# Patient Record
Sex: FEMALE | URBAN - METROPOLITAN AREA
[De-identification: names, ages, dates, MRNs, and addresses within clinical notes are randomized per-mention and may not be internally consistent; named-entity substitution may affect disease eponyms.]

---

## 2023-07-25 ENCOUNTER — HOSPITAL ENCOUNTER (EMERGENCY)
Age: 23
Discharge: LEFT BEFORE DISCHARGE | End: 2023-07-25

## 2024-04-12 ENCOUNTER — APPOINTMENT (OUTPATIENT)
Dept: PRIMARY CARE | Facility: CLINIC | Age: 24
End: 2024-04-12
Payer: COMMERCIAL

## 2024-07-10 ENCOUNTER — APPOINTMENT (OUTPATIENT)
Dept: ORTHOPEDIC SURGERY | Facility: CLINIC | Age: 24
End: 2024-07-10
Payer: COMMERCIAL

## 2024-07-31 ENCOUNTER — OFFICE VISIT (OUTPATIENT)
Dept: ORTHOPEDIC SURGERY | Facility: CLINIC | Age: 24
End: 2024-07-31
Payer: COMMERCIAL

## 2024-07-31 VITALS — WEIGHT: 254 LBS | BODY MASS INDEX: 39.87 KG/M2 | HEIGHT: 67 IN

## 2024-07-31 DIAGNOSIS — M54.2 NECK PAIN: Primary | ICD-10-CM

## 2024-07-31 PROCEDURE — 3008F BODY MASS INDEX DOCD: CPT | Performed by: PHYSICIAN ASSISTANT

## 2024-07-31 PROCEDURE — 99213 OFFICE O/P EST LOW 20 MIN: CPT | Performed by: PHYSICIAN ASSISTANT

## 2024-07-31 PROCEDURE — 99203 OFFICE O/P NEW LOW 30 MIN: CPT | Performed by: PHYSICIAN ASSISTANT

## 2024-07-31 RX ORDER — MELOXICAM 15 MG/1
15 TABLET ORAL DAILY
Qty: 30 TABLET | Refills: 3 | Status: SHIPPED | OUTPATIENT
Start: 2024-07-31

## 2024-07-31 ASSESSMENT — PAIN SCALES - GENERAL: PAINLEVEL_OUTOF10: 2

## 2024-07-31 ASSESSMENT — PAIN - FUNCTIONAL ASSESSMENT: PAIN_FUNCTIONAL_ASSESSMENT: 0-10

## 2024-07-31 ASSESSMENT — PAIN DESCRIPTION - DESCRIPTORS: DESCRIPTORS: ACHING;SORE

## 2024-07-31 NOTE — PROGRESS NOTES
Jose Guadalupe is a 23-year-old female reporting clinic today for evaluation of her neck pain.    Her symptoms started approximately 3 weeks ago, she denies injury or trauma.  She has posterior neck pain that radiates into her upper shoulders and triceps bilaterally.  She also feels this pain is causing tension headaches.  She has numbness and cramping in her hands bilaterally, this has been going on for over a year.  She has noticed she is stumbling more often, no recent falls.  No recent change in her fine motor skills or handwriting, although she admits to dropping things more frequently.  She has full control of her bowel and bladder.    Treatment thus far has consisted of Tylenol and muscle relaxers.    She vapes nicotine products daily.  Family, social, and medical histories are obtained and reviewed.    ROS: All other systems have been reviewed and are negative except as previously noted in history of present illness.    Physical Exam:  Const: Well-appearing, well-nourished female in no distress.  Eyes: Normal appearing sclera and conjunctiva, no jaundice, pupils normal in appearance.  Neck: No masses or lymphadenopathy appreciated.  Resp: breathing comfortably, normal respiratory rate rate.  CV: No upper or lower extremity edema.  Musculoskeletal: Normal gait.  Able to heel/toe walk without difficulty.  Cervical ROM is supple.  Strength exam of the upper extremities reveals 5/5 strength in all major muscle groups.  No intrinsic wasting.  Neuro: Sensation is intact and equal bilaterally. Deep tendon reflexes are normal and symmetric.  No clonus, negative Guadalupe sign, negative Lhermitte's sign  Skin: Intact without any lesions, normal turgor.  Psych: Alert and oriented x3, normal mood and affect.    The plan is to start conservative treatment for her acute neck pain with intermittent radicular symptoms.  A referral for physical therapy was provided today.  A prescription for meloxicam 15 mg was sent to her pharmacy  for inflammation.  If she does not have improvement after 6 weeks physical therapy she should follow-up with me at which time consider an MRI of the cervical spine.    **This note was dictated using speech recognition software and was not corrected for spelling or grammatical errors**

## 2024-11-11 PROBLEM — G43.909 MIGRAINES: Status: ACTIVE | Noted: 2022-12-20

## 2024-11-11 NOTE — PROGRESS NOTES
Subjective   Patient ID:   Jose Guadalupe Prince is a 23 y.o. female who presents for Establish Care and Pressure Behind the Eyes.  HPI  New patient here today to establish care with myself.  Previous PCP: N/A  Last seen:  No significant PMH.  Not on medications.    Migraines:  Was referred by her eye specialist.  Was told she has increased pressure behind her eyes.  Symptoms x years.  Getting worse.  Happening daily.  Has tried and failed Naproxen.  Gets nausea, sensitivity to light and sound, vision changes.  Has never been on specific migraine medication.    Hypokalemia:  States has had low potassium in the past.  No recent labs.    Health maintenance:  Smoking: Never a smoker.  Labs: DUE  Influenza: Fall 2024.    Review of Systems  12 point review of systems negative unless stated above in HPI    Vitals:    11/13/24 1152   BP: 136/88   Pulse: 83   SpO2: 97%     Physical Exam  General: Alert and oriented, well nourished, no acute distress.  Lungs: Clear to auscultation, non-labored respiration.  Heart: Normal rate, regular rhythm, no murmur, gallop or edema.  Neurologic: Awake, alert, and oriented X3, CN II-XII intact.  Psychiatric: Cooperative, appropriate mood and affect.    Assessment/Plan   It was nice meeting you!  I have placed a referral to neurology for further management.  I have also sent in Topamax and Imitrex to start for headaches.  Consider head imaging.  I have ordered some labs to be done as soon as you can.  We will call you with the results.  If symptoms persist or worsen despite current plan of care, please contact your healthcare provider for further evaluation.  Patient instructed to contact the office if there are any questions regarding their care or treatment.   [facility] [phone]    Fu 1 month for headaches.  Diagnoses and all orders for this visit:  Migraine without status migrainosus, not intractable, unspecified migraine type  -     Referral to Neurology;  Future  -     topiramate (Topamax) 25 mg tablet; Take 1 tablet (25 mg) by mouth 2 times a day.  -     SUMAtriptan (Imitrex) 50 mg tablet; Take 1 tablet (50 mg) by mouth 1 time if needed for migraine. May repeat after 2 hours.  -     Comprehensive Metabolic Panel; Future  -     Lipid Panel; Future  -     CBC and Auto Differential; Future  Hypokalemia  BMI 40.0-44.9, adult (Multi)  Severe obesity (BMI >= 40) (Multi)

## 2024-11-13 ENCOUNTER — OFFICE VISIT (OUTPATIENT)
Dept: PRIMARY CARE | Facility: CLINIC | Age: 24
End: 2024-11-13
Payer: COMMERCIAL

## 2024-11-13 VITALS
DIASTOLIC BLOOD PRESSURE: 88 MMHG | WEIGHT: 258.4 LBS | SYSTOLIC BLOOD PRESSURE: 136 MMHG | BODY MASS INDEX: 40.47 KG/M2 | HEART RATE: 83 BPM | OXYGEN SATURATION: 97 %

## 2024-11-13 DIAGNOSIS — G43.909 MIGRAINE WITHOUT STATUS MIGRAINOSUS, NOT INTRACTABLE, UNSPECIFIED MIGRAINE TYPE: Primary | ICD-10-CM

## 2024-11-13 DIAGNOSIS — E87.6 HYPOKALEMIA: ICD-10-CM

## 2024-11-13 DIAGNOSIS — E66.01 SEVERE OBESITY (BMI >= 40) (MULTI): ICD-10-CM

## 2024-11-13 PROCEDURE — 99203 OFFICE O/P NEW LOW 30 MIN: CPT | Performed by: PHYSICIAN ASSISTANT

## 2024-11-13 PROCEDURE — 99213 OFFICE O/P EST LOW 20 MIN: CPT | Performed by: PHYSICIAN ASSISTANT

## 2024-11-13 RX ORDER — TOPIRAMATE 25 MG/1
25 TABLET ORAL 2 TIMES DAILY
Qty: 60 TABLET | Refills: 1 | Status: SHIPPED | OUTPATIENT
Start: 2024-11-13 | End: 2025-01-12

## 2024-11-13 RX ORDER — SUMATRIPTAN 50 MG/1
50 TABLET, FILM COATED ORAL ONCE AS NEEDED
Qty: 27 TABLET | Refills: 1 | Status: SHIPPED | OUTPATIENT
Start: 2024-11-13 | End: 2025-11-13

## 2024-11-13 ASSESSMENT — LIFESTYLE VARIABLES
HOW MANY STANDARD DRINKS CONTAINING ALCOHOL DO YOU HAVE ON A TYPICAL DAY: 1 OR 2
HOW OFTEN DO YOU HAVE A DRINK CONTAINING ALCOHOL: MONTHLY OR LESS
HOW OFTEN DO YOU HAVE SIX OR MORE DRINKS ON ONE OCCASION: NEVER
AUDIT-C TOTAL SCORE: 1
SKIP TO QUESTIONS 9-10: 1

## 2024-11-13 ASSESSMENT — ENCOUNTER SYMPTOMS
OCCASIONAL FEELINGS OF UNSTEADINESS: 0
LOSS OF SENSATION IN FEET: 0
DEPRESSION: 0

## 2024-11-13 ASSESSMENT — PATIENT HEALTH QUESTIONNAIRE - PHQ9
1. LITTLE INTEREST OR PLEASURE IN DOING THINGS: NOT AT ALL
SUM OF ALL RESPONSES TO PHQ9 QUESTIONS 1 AND 2: 0
2. FEELING DOWN, DEPRESSED OR HOPELESS: NOT AT ALL

## 2024-11-13 ASSESSMENT — PAIN SCALES - GENERAL: PAINLEVEL_OUTOF10: 0-NO PAIN

## 2024-12-11 ENCOUNTER — LAB (OUTPATIENT)
Dept: LAB | Facility: LAB | Age: 24
End: 2024-12-11
Payer: COMMERCIAL

## 2024-12-11 DIAGNOSIS — Z32.01 POSITIVE PREGNANCY TEST (HHS-HCC): Primary | ICD-10-CM

## 2024-12-11 DIAGNOSIS — G43.909 MIGRAINE WITHOUT STATUS MIGRAINOSUS, NOT INTRACTABLE, UNSPECIFIED MIGRAINE TYPE: ICD-10-CM

## 2024-12-11 DIAGNOSIS — Z32.01 POSITIVE PREGNANCY TEST (HHS-HCC): ICD-10-CM

## 2024-12-11 DIAGNOSIS — E87.6 HYPOKALEMIA: ICD-10-CM

## 2024-12-11 LAB
ALBUMIN SERPL BCP-MCNC: 3.8 G/DL (ref 3.4–5)
ALP SERPL-CCNC: 81 U/L (ref 33–110)
ALT SERPL W P-5'-P-CCNC: 19 U/L (ref 7–45)
ANION GAP SERPL CALCULATED.3IONS-SCNC: 11 MMOL/L (ref 10–20)
AST SERPL W P-5'-P-CCNC: 12 U/L (ref 9–39)
B-HCG SERPL-ACNC: <2 MIU/ML
BASOPHILS # BLD AUTO: 0.01 X10*3/UL (ref 0–0.1)
BASOPHILS NFR BLD AUTO: 0.1 %
BILIRUB SERPL-MCNC: 0.4 MG/DL (ref 0–1.2)
BUN SERPL-MCNC: 7 MG/DL (ref 6–23)
CALCIUM SERPL-MCNC: 8.6 MG/DL (ref 8.6–10.3)
CHLORIDE SERPL-SCNC: 103 MMOL/L (ref 98–107)
CHOLEST SERPL-MCNC: 181 MG/DL (ref 0–199)
CHOLEST/HDLC SERPL: 4.2 {RATIO}
CO2 SERPL-SCNC: 28 MMOL/L (ref 21–32)
CREAT SERPL-MCNC: 0.62 MG/DL (ref 0.5–1.05)
EGFRCR SERPLBLD CKD-EPI 2021: >90 ML/MIN/1.73M*2
EOSINOPHIL # BLD AUTO: 0.07 X10*3/UL (ref 0–0.7)
EOSINOPHIL NFR BLD AUTO: 1 %
ERYTHROCYTE [DISTWIDTH] IN BLOOD BY AUTOMATED COUNT: 14.6 % (ref 11.5–14.5)
GLUCOSE SERPL-MCNC: 108 MG/DL (ref 74–99)
HCT VFR BLD AUTO: 39.5 % (ref 36–46)
HDLC SERPL-MCNC: 43.4 MG/DL
HGB BLD-MCNC: 12 G/DL (ref 12–16)
IMM GRANULOCYTES # BLD AUTO: 0.02 X10*3/UL (ref 0–0.7)
IMM GRANULOCYTES NFR BLD AUTO: 0.3 % (ref 0–0.9)
LDLC SERPL CALC-MCNC: 123 MG/DL
LYMPHOCYTES # BLD AUTO: 2.51 X10*3/UL (ref 1.2–4.8)
LYMPHOCYTES NFR BLD AUTO: 36.6 %
MCH RBC QN AUTO: 25.5 PG (ref 26–34)
MCHC RBC AUTO-ENTMCNC: 30.4 G/DL (ref 32–36)
MCV RBC AUTO: 84 FL (ref 80–100)
MONOCYTES # BLD AUTO: 0.57 X10*3/UL (ref 0.1–1)
MONOCYTES NFR BLD AUTO: 8.3 %
NEUTROPHILS # BLD AUTO: 3.67 X10*3/UL (ref 1.2–7.7)
NEUTROPHILS NFR BLD AUTO: 53.7 %
NON HDL CHOLESTEROL: 138 MG/DL (ref 0–149)
NRBC BLD-RTO: 0 /100 WBCS (ref 0–0)
PLATELET # BLD AUTO: 304 X10*3/UL (ref 150–450)
POTASSIUM SERPL-SCNC: 3.3 MMOL/L (ref 3.5–5.3)
PROT SERPL-MCNC: 7.1 G/DL (ref 6.4–8.2)
RBC # BLD AUTO: 4.71 X10*6/UL (ref 4–5.2)
SODIUM SERPL-SCNC: 139 MMOL/L (ref 136–145)
TRIGL SERPL-MCNC: 74 MG/DL (ref 0–114)
VLDL: 15 MG/DL (ref 0–40)
WBC # BLD AUTO: 6.9 X10*3/UL (ref 4.4–11.3)

## 2024-12-11 PROCEDURE — 36415 COLL VENOUS BLD VENIPUNCTURE: CPT

## 2024-12-11 PROCEDURE — 80053 COMPREHEN METABOLIC PANEL: CPT

## 2024-12-11 PROCEDURE — 84702 CHORIONIC GONADOTROPIN TEST: CPT

## 2024-12-11 PROCEDURE — 80061 LIPID PANEL: CPT

## 2024-12-11 PROCEDURE — 85025 COMPLETE CBC W/AUTO DIFF WBC: CPT

## 2024-12-11 RX ORDER — POTASSIUM CHLORIDE 20 MEQ/1
20 TABLET, EXTENDED RELEASE ORAL DAILY
Qty: 90 TABLET | Refills: 0 | Status: SHIPPED | OUTPATIENT
Start: 2024-12-11 | End: 2025-03-11

## 2024-12-16 ENCOUNTER — APPOINTMENT (OUTPATIENT)
Dept: PRIMARY CARE | Facility: CLINIC | Age: 24
End: 2024-12-16
Payer: COMMERCIAL

## 2024-12-30 ENCOUNTER — APPOINTMENT (OUTPATIENT)
Dept: NEUROLOGY | Facility: CLINIC | Age: 24
End: 2024-12-30
Payer: COMMERCIAL

## 2024-12-30 VITALS
HEIGHT: 67 IN | HEART RATE: 86 BPM | SYSTOLIC BLOOD PRESSURE: 141 MMHG | DIASTOLIC BLOOD PRESSURE: 75 MMHG | TEMPERATURE: 96.8 F | WEIGHT: 253 LBS | BODY MASS INDEX: 39.71 KG/M2

## 2024-12-30 DIAGNOSIS — H47.10 PAPILLEDEMA OF BOTH EYES: ICD-10-CM

## 2024-12-30 DIAGNOSIS — G43.109 MIGRAINE WITH AURA AND WITHOUT STATUS MIGRAINOSUS, NOT INTRACTABLE: Primary | ICD-10-CM

## 2024-12-30 PROCEDURE — 1036F TOBACCO NON-USER: CPT | Performed by: PSYCHIATRY & NEUROLOGY

## 2024-12-30 PROCEDURE — 99205 OFFICE O/P NEW HI 60 MIN: CPT | Performed by: PSYCHIATRY & NEUROLOGY

## 2024-12-30 PROCEDURE — 3008F BODY MASS INDEX DOCD: CPT | Performed by: PSYCHIATRY & NEUROLOGY

## 2024-12-30 RX ORDER — PANTOPRAZOLE SODIUM 40 MG/1
40 TABLET, DELAYED RELEASE ORAL DAILY
COMMUNITY
Start: 2023-04-05

## 2024-12-30 RX ORDER — NORTRIPTYLINE HYDROCHLORIDE 25 MG/1
CAPSULE ORAL
Qty: 60 CAPSULE | Refills: 3 | Status: SHIPPED | OUTPATIENT
Start: 2024-12-30

## 2024-12-30 RX ORDER — IBUPROFEN 800 MG/1
1 TABLET ORAL EVERY 8 HOURS
COMMUNITY

## 2024-12-30 NOTE — PROGRESS NOTES
"NEUROLOGY OUTPATIENT INITIAL VISIT NOTE    Assessment/Plan   Diagnoses and all orders for this visit:  Migraine with aura and without status migrainosus, not intractable  -     nortriptyline (Pamelor) 25 mg capsule; 1 by mouth at bedtime for two weeks, then 2 at bedtime  Papilledema of both eyes  -     Referral to Neurology; Future (Jaswant Pennington:  Neuro-ophthalmology)  -     MR orbit w and wo IV contrast; Future  -     MR brain w and wo IV contrast; Future      IMPRESSION:  Migrainoid headaches suggestive of migraine with aura.  However, I cannot absolutely rule out idiopathic intracranial hypertension.    PLAN:  I started the patient on nortriptyline titration to 50 mg at bedtime for migraine prophylaxis.  I ordered an orbital and cranial MRI to compare with the previous study from 2018, given worsening symptoms.  I am considering adding acetazolamide, but will do further workup first.  I referred her to Jaswant Pennington (Neuro-ophthalmology for a proper fundoscopic examination and comment on whether this is papilledema or simply tilted optic discs.  I requested a copy of the LP report from [facility].  I will see her in 3-4 months or prn.        rAden Bryson Jr., M.D., FAAN     --------      Subjective     Antiona SALVADOR Prince is a 24 y.o. year old, right-handed female referred for headaches.  History comes from the patient and from EMR review.    HPI  She has had headaches since about age 16.    Aura is an afterimage of dark circles in both eyes, mainly the right eye.  This is followed within 24 hours by headaches.    Two types of headaches:  First is daily primarily right stabbing/pressure pain, thumping around eyelids, and neck pain.  Frequency is 3-4 times a week.  Duration is the entire day.    Second are the \"bad headaches\", which are more severe version of the first headache, and they may be bilateral, and can extend to the neck.  These are about twice a month and can last 3-4 days.    She has " photophobia, phonophobia, nausea, and rarely  emesis, with the headaches.    She can have episodes of transient visual loss lasting seconds, up to daily.  They have been daily for the last two weeks.  She has had reduced visual fields in the central vision.    Chart review indicates that in 2018, she also had blurred vision with headache.  She was seen by an optometrist, who noted mild bilateral optic nerve edema. She was referred to a neurologist at [facility], who referred her for cranial MRI (resulted below).  She also had a LP to measure opening pressure.  However, the result was not posted in the procedure note.  The patient doesn't recall what the opening pressure was, and I saw no follow-up visit with the neurologist.    She was started on sumatriptan, which helps stop individual headaches.  She was also started on topiramate by her PCNP, but this medication caused tingling paresthesias and she hasn't been able to tolerate it.    The patient's mother has idiopathic intracranial hypertension and is on acetazolamide.    Review of Systems    No past medical history on file.  No past surgical history on file.  Social History     Tobacco Use    Smoking status: Never    Smokeless tobacco: Never    Tobacco comments:     Vape   Substance Use Topics    Alcohol use: Yes     Comment: ocassional     family history is not on file.    Current Outpatient Medications:     pantoprazole (ProtoNix) 40 mg EC tablet, Take 1 tablet (40 mg) by mouth once daily., Disp: , Rfl:     ibuprofen 800 mg tablet, Take 1 tablet (800 mg) by mouth every 8 hours., Disp: , Rfl:     potassium chloride CR (Klor-Con M20) 20 mEq ER tablet, Take 1 tablet (20 mEq) by mouth once daily. Do not crush or chew., Disp: 90 tablet, Rfl: 0    SUMAtriptan (Imitrex) 50 mg tablet, Take 1 tablet (50 mg) by mouth 1 time if needed for migraine. May repeat after 2 hours., Disp: 27 tablet, Rfl: 1    topiramate (Topamax) 25 mg tablet, Take 1 tablet (25 mg) by mouth 2 times a  "day. (Patient not taking: Reported on 12/30/2024), Disp: 60 tablet, Rfl: 1  No Known Allergies    Objective     /75   Pulse 86   Temp 36 °C (96.8 °F)   Ht 1.702 m (5' 7\")   Wt 115 kg (253 lb)   BMI 39.63 kg/m²     CONSTITUTIONAL:  No acute distress    CARDIOVASCULAR:  Normal pulses in the distal legs, no edema of either arm or either leg.  No carotid bruits.    MENTAL STATUS:  Awake, alert, fully oriented to self, place, and time, with present short-term memory, good awareness of recent events, normal attention span, concentration, and fund of knowledge.    SPEECH AND LANGUAGE:  Can name and repeat, follows all commands, has no dysarthria    FUNDOSCOPIC:  Mild bilateral papilledema vs tilted optic discs.  No optic disc pallor.    CRANIAL NERVES:  II-Vision present, visual fields reduced on both sides, more so on the left.    III/IV/VI--EOMs are present in all directions.  Pupils are symmetrically reactive in dim light.  No ptosis.    V--Normal facial sensation.    VII--No facial asymmetry.    VIII--Hearing present to voice bilaterally.    IX/X--Symmetric soft palate rise.    XI--Normal trapezius power bilaterally.    XII--Tongue protrudes without deviation.    MOTOR:  Normal power, tone, and bulk in both arms and both legs.    SENSORY:  Normal pin sensation in both arms and both legs without distal-proximal gradient, asymmetry, or spinal sensory level.    COORDINATION:  Normal finger-to-nose and heel-to-shin testing in both arms and both legs.    REFLEXES are normal and symmetric at the biceps, triceps, brachioradialis, patella, and ankle.  The plantar responses are flexor.    GAIT is normal, without steppage, ataxia, shuffling, or spasticity.    10/22/2019 7:53 PM EDT   EXAMINATION: MR HEAD W/O    CLINICAL HISTORY: headache, papilledema [facility]    TECHNOLOGISTS NOTE: Metal artifact from braces.    COMPARISON: None    TECHNIQUE:  Patient questionnaire was completed, and was reviewed by MRI  personnel prior to " the patient entering the scanner.    Multiplanar, multisequence MR imaging of the head was performed  without intravenous contrast.    FINDINGS:  Acute Change: No evidence of an acute infarct or intracranial  hemorrhage.    Mass Effect/Mass Lesion: No intracranial mass or extra-axial fluid  collection.  No mass effect.    Chronic Change:The white matter is within normal limits of signal  intensity for age.    Ventricles and sulci: Normal caliber.    Skull Base: Hypothalamic and pituitary region are grossly normal.    Craniocervical junction is normal. No marrow replacement process.    Vasculature: Major intracranial vessels have normal flow voids  suggesting patency.    Other: The paranasal sinuses and mastoid air cells are clear. The  orbits are obscured because of susceptibility artifacts    IMPRESSION:  No intracranial mass lesion, midline shift or hydrocephalus. The  orbits are obscured because of susceptibility artifacts.      [facility] CT angio 11/19/2021:  IMPRESSION:    No evidence of large vessel occlusion, critical stenosis, or aneurysm  involving the visualized proximal arteries of the head and neck.    Arterial blood flow was measured to detect acute large vessel occlusion  by computer aided detection software: Not Performed.  Concordance between software and imaging review: Not Applicable.                  : KEELEY    Transcribe Date/Time: Nov 19 2021  4:13P    Dictated by : SRIDHAR OLIVO MD    This examination was interpreted and the report reviewed and  electronically signed by:  SRIDHAR OLIVO MD on Nov 19 2021  4:18PM  EST  Narrative    * * *Final Report* * *    DATE OF EXAM: Nov 19 2021  3:21PM      [facility]   0022  -  CTA HEAD W IVCON  / ACCESSION #  [id number]    PROCEDURE REASON: Headache, sudden, unilateral, ipsilat Irene syndrome  or carotid/vertebral disse        * * * * Physician Interpretation * * * *     EXAMINATION:  CTA HEAD W IVCON, CTA NECK W IVCON    HISTORY:  Headache, sudden,  unilateral, ipsilat Irene syndrome or  carotid/vertebral disse  Per triage note, migraine headache since Saturday, pain in both temples,  back of head/neck and upper back    TECHNIQUE:   Spiral high resolution axial images were obtained through  the head, neck and superior mediastinum following bolus administration of  intravenous contrast for CT angiography.     3D maximum intensity  projection images were created, reviewed and archived .    MQ:  [id number]_[id number]    Contrast:  80 mL Omnipaque 350 IV  CT Radiation dose: Integrated Dose-Length Product (DLP) for this visit =    551 mGy*cm.  CT Dose Reduction Employed: Automated exposure control(AEC) and iterative  recon    COMPARISON: None.      RESULT:      BRAIN:    No intracranial mass effect.    Evaluation of the individual slices of the CTA demonstrates no evidence  of an acute stroke.  ASPECT Score = 10    Hemorrhage:    No evidence of acute intracranial hemorrhage.   ECASS  hemorrhagic transformation score: Not Applicable    Spot Sign Presence:  Not Applicable  Spot Sign Number:  Not Applicable      NECK:    Soft tissues:   The soft tissue planes are maintained throughout.  No  evidence of a soft tissue mass in the neck or superior mediastinum.  No  significant lymphadenopathy is seen.    Spine:  Alignment is normal.  No significant degenerative changes are  present.    Lung apices:   The visualized lung apices are clear.      CT ARTERIOGRAM:    Extracranial Circulation:    Aortic Arch: There is a normal branching pattern from the aortic arch..    There is no significant stenosis in the proximal brachiocephalic vessels.    Carotid Stenosis:  Right Common:  No significant stenosis.  Right Internal Carotid  Plaque:  No significant plaque formation.  Right Internal Carotid Stenosis (% by NASCET Criteria):  0    Left Common:  No significant stenosis.  Left Internal Carotid Plaque:  No significant plaque formation.  Left Internal Carotid Stenosis (% by NASCET Criteria):   0    Cervical Vertebral Arteries:  Patency:  Bilateral  Dominance:  Codominant      Intracranial Circulation:    Anterior Circulation:  Bilateral distal ICAs, proximal MCAs, and proximal  ACAs appear widely patent.    Vertebrobasilar Circulation:   Bilateral distal vertebral arteries,  proximal PICAs, basilar artery, proximal bilateral AICAs, proximal  bilateral SCAs, and proximal bilateral PCAs appear widely patent.    No evidence of intracranial aneurysm.     (topogram) images: No additional findings.  Arden Bryson Jr., M.D., FAAN

## 2024-12-30 NOTE — PATIENT INSTRUCTIONS
Stop topiramate    Nortriptyline 1 cap at bedtime for two weeks and if no change in the number of headaches, then  2 at bedtime

## 2025-01-16 ENCOUNTER — APPOINTMENT (OUTPATIENT)
Dept: NEUROLOGY | Facility: CLINIC | Age: 25
End: 2025-01-16
Payer: COMMERCIAL

## 2025-02-04 ENCOUNTER — APPOINTMENT (OUTPATIENT)
Dept: NEUROLOGY | Facility: HOSPITAL | Age: 25
End: 2025-02-04
Payer: COMMERCIAL

## 2025-03-31 ENCOUNTER — APPOINTMENT (OUTPATIENT)
Dept: NEUROLOGY | Facility: CLINIC | Age: 25
End: 2025-03-31
Payer: COMMERCIAL

## 2025-03-31 VITALS
HEART RATE: 94 BPM | DIASTOLIC BLOOD PRESSURE: 77 MMHG | SYSTOLIC BLOOD PRESSURE: 131 MMHG | TEMPERATURE: 96.9 F | HEIGHT: 67 IN | WEIGHT: 248 LBS | BODY MASS INDEX: 38.92 KG/M2

## 2025-03-31 DIAGNOSIS — R10.84 GENERALIZED ABDOMINAL PAIN: ICD-10-CM

## 2025-03-31 DIAGNOSIS — G43.109 MIGRAINE WITH AURA AND WITHOUT STATUS MIGRAINOSUS, NOT INTRACTABLE: Primary | ICD-10-CM

## 2025-03-31 PROCEDURE — 1036F TOBACCO NON-USER: CPT | Performed by: PSYCHIATRY & NEUROLOGY

## 2025-03-31 PROCEDURE — 99212 OFFICE O/P EST SF 10 MIN: CPT | Performed by: PSYCHIATRY & NEUROLOGY

## 2025-03-31 PROCEDURE — 3008F BODY MASS INDEX DOCD: CPT | Performed by: PSYCHIATRY & NEUROLOGY

## 2025-03-31 RX ORDER — NORTRIPTYLINE HYDROCHLORIDE 25 MG/1
50 CAPSULE ORAL NIGHTLY
Qty: 60 CAPSULE | Refills: 3 | Status: SHIPPED | OUTPATIENT
Start: 2025-03-31 | End: 2025-07-29

## 2025-03-31 RX ORDER — POTASSIUM CHLORIDE 750 MG/1
TABLET, FILM COATED, EXTENDED RELEASE ORAL
COMMUNITY
Start: 2023-10-31

## 2025-03-31 NOTE — PROGRESS NOTES
"NEUROLOGY OUTPATIENT FOLLOW-UP NOTE    Assessment/Plan   Diagnoses and all orders for this visit:  Migraine with aura and without status migrainosus, not intractable  -     nortriptyline (Pamelor) 25 mg capsule; Take 2 capsules (50 mg) by mouth once daily at bedtime.  Generalized abdominal pain  -     XR abdomen 3+ views; Future      IMPRESSION:  Migraine with aura, worse in the context of subacute GI process.  She doesn't associate the GI symptoms with nortriptyline and is awaiting GI workup, which is an issue as her insurance has lapsed.    PLAN:  To continue nortriptyline for now.  I ordered plain abdominal films while she is awaiting GI workup.  I will see her in 3-4 months or prn for the headaches.      Arden Bryson Jr., M.D., FAAN   ----------    Subjective     Antiona SALVADOR Prince is a 24 y.o. year old female here for follow-up.    Migraines are helped by the nortriptyline, but the headaches bounced back in the context of GI symptoms in the last six weeks.    She reports left lower quadrant abdominal pain with bowel movements and sharp, diffuse abdominal pain with or without bowel movements, mucus in her stool, for the last six weeks.  She initially had bleeding with BMs, but no longer.  She has been \"dizzy\" (positional lightheadedness) for about the same amount of time.  She was in the [facility] ED a month ago and was referred to GI.  However, she lost her insurance around that time, and hasn't followed up with GI.    No past medical history on file.  No past surgical history on file.  Social History     Tobacco Use    Smoking status: Never     Passive exposure: Never    Smokeless tobacco: Never    Tobacco comments:     Vape   Substance Use Topics    Alcohol use: Yes     Comment: ocassional     family history includes No Known Problems in her father and mother.    Current Outpatient Medications:     potassium chloride CR 10 mEq ER tablet, , Disp: , Rfl:     nortriptyline (Pamelor) 25 mg capsule, 1 by " "mouth at bedtime for two weeks, then 2 at bedtime (Patient taking differently: Take 2 capsules (50 mg) by mouth once daily at bedtime. 1 by mouth at bedtime for two weeks, then 2 at bedtime), Disp: 60 capsule, Rfl: 3    SUMAtriptan (Imitrex) 50 mg tablet, Take 1 tablet (50 mg) by mouth 1 time if needed for migraine. May repeat after 2 hours., Disp: 27 tablet, Rfl: 1  No Known Allergies    Objective     /77   Pulse 94   Temp 36.1 °C (96.9 °F)   Ht 1.702 m (5' 7\")   Wt 112 kg (248 lb)   BMI 38.84 kg/m²     CONSTITUTIONAL:  No acute distress    MENTAL STATUS:  Awake, alert, fully oriented to self, place, and time, with present short-term memory, good awareness of recent events, normal attention span, concentration, and fund of knowledge.    SPEECH AND LANGUAGE:  Can name and repeat, follows all commands, has no dysarthria    CRANIAL NERVES:  II-Vision present, visual fields full to confrontational testing    III/IV/VI--EOMs are present in all directions.  Pupils are symmetrically reactive in dim light.  No ptosis.    V--Normal facial sensation.    VII--No facial asymmetry.    VIII--Hearing present to voice bilaterally.    IX/X--Symmetric soft palate rise.    XI--Normal trapezius power bilaterally.    XII--Tongue protrudes without deviation.    MOTOR:  Normal power, tone, and bulk in both arms and both legs.    SENSORY:  Normal pin sensation in both arms and both legs without distal-proximal gradient, asymmetry, or spinal sensory level.    COORDINATION:  Normal finger-to-nose and heel-to-shin testing in both arms and both legs.    REFLEXES are normal and symmetric at the biceps, triceps, brachioradialis, patella, and ankle.  The plantar responses are flexor.    GAIT is normal, without steppage, ataxia, shuffling, or spasticity.      Arden Bryson Jr., M.D., FAAN    "

## 2025-05-30 ENCOUNTER — APPOINTMENT (OUTPATIENT)
Dept: NEUROLOGY | Facility: CLINIC | Age: 25
End: 2025-05-30
Payer: COMMERCIAL